# Patient Record
Sex: MALE | Race: WHITE | NOT HISPANIC OR LATINO | Employment: FULL TIME | URBAN - METROPOLITAN AREA
[De-identification: names, ages, dates, MRNs, and addresses within clinical notes are randomized per-mention and may not be internally consistent; named-entity substitution may affect disease eponyms.]

---

## 2017-04-04 ENCOUNTER — OFFICE VISIT (OUTPATIENT)
Dept: MEDICAL GROUP | Facility: PHYSICIAN GROUP | Age: 32
End: 2017-04-04
Payer: COMMERCIAL

## 2017-04-04 VITALS
OXYGEN SATURATION: 100 % | SYSTOLIC BLOOD PRESSURE: 110 MMHG | TEMPERATURE: 98.5 F | HEIGHT: 73 IN | BODY MASS INDEX: 24.39 KG/M2 | DIASTOLIC BLOOD PRESSURE: 60 MMHG | WEIGHT: 184 LBS | HEART RATE: 91 BPM | RESPIRATION RATE: 16 BRPM

## 2017-04-04 DIAGNOSIS — I10 BENIGN ESSENTIAL HTN: ICD-10-CM

## 2017-04-04 PROCEDURE — 99214 OFFICE O/P EST MOD 30 MIN: CPT | Performed by: FAMILY MEDICINE

## 2017-04-04 RX ORDER — LISINOPRIL AND HYDROCHLOROTHIAZIDE 12.5; 1 MG/1; MG/1
1 TABLET ORAL DAILY
Qty: 90 TAB | Refills: 1 | Status: SHIPPED | OUTPATIENT
Start: 2017-04-04 | End: 2019-10-08 | Stop reason: SDUPTHER

## 2017-04-04 ASSESSMENT — ENCOUNTER SYMPTOMS
EYES NEGATIVE: 1
NECK PAIN: 0
CONSTIPATION: 0
FEVER: 0
NEUROLOGICAL NEGATIVE: 1
PALPITATIONS: 0
CONSTITUTIONAL NEGATIVE: 1
COUGH: 0
DIZZINESS: 0
RESPIRATORY NEGATIVE: 1
CARDIOVASCULAR NEGATIVE: 1
HEMOPTYSIS: 0
MYALGIAS: 0
HEADACHES: 0
HYPERTENSION: 1
PSYCHIATRIC NEGATIVE: 1
MUSCULOSKELETAL NEGATIVE: 1
CHILLS: 0
GASTROINTESTINAL NEGATIVE: 1

## 2017-04-04 NOTE — PROGRESS NOTES
Subjective:      Marty Jeffries is a 32 y.o. male who presents with Follow-Up            HPI Comments: There are no diagnoses linked to this encounter.  Past Medical History:    Hypertension                                                Past Surgical History:    TONSILLECTOMY                                                  Smoking Status: Former Smoker                   Packs/Day: 0.00  Years:            Quit date: 08/25/2012    Smokeless Status: Never Used                        Alcohol Use: Yes             No family history on file.      Current outpatient prescriptions: •  lisinopril-hydrochlorothiazide (PRINZIDE, ZESTORETIC) 10-12.5 MG per tablet, Take 1 Tab by mouth every day., Disp: 90 Tab, Rfl: 1    Assessment/plan: diagnoses listed as above in problem list. Patient will continue with current medications/diet/lifestyle modifications    Patient will continue with current medication regimen, advised on taking meds every day, f/u in 3 mo.          Hypertension  This is a chronic problem. The current episode started more than 1 year ago. The problem is unchanged. The problem is controlled. Pertinent negatives include no anxiety, chest pain, headaches, neck pain or palpitations. Past treatments include ACE inhibitors and diuretics. The current treatment provides significant improvement.       Review of Systems   Constitutional: Negative.  Negative for fever and chills.        Past Medical History:    Hypertension                                                Past Surgical History:    TONSILLECTOMY                                                  Smoking Status: Former Smoker                   Packs/Day: 0.00  Years:           Quit date: 08/25/2012    Smokeless Status: Never Used                        Alcohol Use: Yes             No family history on file.     HENT: Negative.    Eyes: Negative.    Respiratory: Negative.  Negative for cough and hemoptysis.    Cardiovascular: Negative.  Negative for chest pain and  "palpitations.   Gastrointestinal: Negative.  Negative for constipation.   Genitourinary: Negative.  Negative for dysuria and urgency.   Musculoskeletal: Negative.  Negative for myalgias and neck pain.   Skin: Negative.  Negative for rash.   Neurological: Negative.  Negative for dizziness and headaches.   Endo/Heme/Allergies: Negative.    Psychiatric/Behavioral: Negative.  Negative for suicidal ideas.          Objective:     /60 mmHg  Pulse 91  Temp(Src) 36.9 °C (98.5 °F)  Resp 16  Ht 1.854 m (6' 0.99\")  Wt 83.462 kg (184 lb)  BMI 24.28 kg/m2  SpO2 100%     Physical Exam   Constitutional: He is oriented to person, place, and time. No distress.   HENT:   Head: Normocephalic and atraumatic.   Right Ear: External ear normal.   Left Ear: External ear normal.   Nose: Nose normal.   Mouth/Throat: Oropharynx is clear and moist. No oropharyngeal exudate.   Eyes: Pupils are equal, round, and reactive to light. Right eye exhibits no discharge. Left eye exhibits no discharge. No scleral icterus.   Neck: Normal range of motion. Neck supple. No JVD present. No tracheal deviation present. No thyromegaly present.   Cardiovascular: Normal rate, regular rhythm, normal heart sounds and intact distal pulses.  Exam reveals no gallop and no friction rub.    No murmur heard.  Pulmonary/Chest: Effort normal and breath sounds normal. No stridor. No respiratory distress. He has no wheezes. He has no rales. He exhibits no tenderness.   Abdominal: Soft. He exhibits no distension. There is no tenderness.   Lymphadenopathy:     He has no cervical adenopathy.   Neurological: He is alert and oriented to person, place, and time.   Skin: Skin is warm and dry. He is not diaphoretic.   Psychiatric: Judgment normal.   Nursing note and vitals reviewed.              Assessment/Plan:     There are no diagnoses linked to this encounter.    htn under control will continue meds  "

## 2017-04-04 NOTE — MR AVS SNAPSHOT
"        Marty Jeffries   2017 11:00 AM   Office Visit   MRN: [id number]    Department:  [facility]    Dept Phone:  [phone]    Description:  Male : 1985   Provider:  Juan Carlos Mancuso M.D.           Reason for Visit     Follow-Up Blood pressure      Allergies as of 2017     No Known Allergies      You were diagnosed with     Benign essential HTN   [233682]         Vital Signs     Blood Pressure Pulse Temperature Respirations Height Weight    110/60 mmHg 91 36.9 °C (98.5 °F) 16 1.854 m (6' 0.99\") 83.462 kg (184 lb)    Body Mass Index Oxygen Saturation Smoking Status             24.28 kg/m2 100% Former Smoker         Basic Information     Date Of Birth Sex Race Ethnicity Preferred Language    1985 Male White Non- English      Problem List              ICD-10-CM Priority Class Noted - Resolved    Essential hypertension I10   2015 - Present      Health Maintenance        Date Due Completion Dates    IMM DTaP/Tdap/Td Vaccine (1 - Tdap) 3/14/2002 ---            Current Immunizations     No immunizations on file.      Below and/or attached are the medications your provider expects you to take. Review all of your home medications and newly ordered medications with your provider and/or pharmacist. Follow medication instructions as directed by your provider and/or pharmacist. Please keep your medication list with you and share with your provider. Update the information when medications are discontinued, doses are changed, or new medications (including over-the-counter products) are added; and carry medication information at all times in the event of emergency situations     Allergies:  No Known Allergies          Medications  Valid as of: 2017 - 11:24 AM    Generic Name Brand Name Tablet Size Instructions for use    Lisinopril-Hydrochlorothiazide (Tab) PRINZIDE, ZESTORETIC 10-12.5 MG Take 1 Tab by mouth every day.        .                 Medicines prescribed today were sent " to:     [id number] - [place], [place] - [address]    [address] [place] [address]    Phone: [phone] Fax: [phone]    Open 24 Hours?: No      Medication refill instructions:       If your prescription bottle indicates you have medication refills left, it is not necessary to call your provider’s office. Please contact your pharmacy and they will refill your medication.    If your prescription bottle indicates you do not have any refills left, you may request refills at any time through one of the following ways: The online [employer] system (except Urgent Care), by calling your provider’s office, or by asking your pharmacy to contact your provider’s office with a refill request. Medication refills are processed only during regular business hours and may not be available until the next business day. Your provider may request additional information or to have a follow-up visit with you prior to refilling your medication.   *Please Note: Medication refills are assigned a new Rx number when refilled electronically. Your pharmacy may indicate that no refills were authorized even though a new prescription for the same medication is available at the pharmacy. Please request the medicine by name with the pharmacy before contacting your provider for a refill.           [employer] Access Code: Activation code not generated  Current [employer] Status: Active

## 2019-09-19 ENCOUNTER — OFFICE VISIT (OUTPATIENT)
Dept: URGENT CARE | Facility: PHYSICIAN GROUP | Age: 34
End: 2019-09-19
Payer: COMMERCIAL

## 2019-09-19 VITALS
SYSTOLIC BLOOD PRESSURE: 158 MMHG | TEMPERATURE: 98.9 F | HEART RATE: 111 BPM | OXYGEN SATURATION: 96 % | RESPIRATION RATE: 14 BRPM | HEIGHT: 73 IN | WEIGHT: 190 LBS | BODY MASS INDEX: 25.18 KG/M2 | DIASTOLIC BLOOD PRESSURE: 78 MMHG

## 2019-09-19 DIAGNOSIS — S00.81XA FOREHEAD ABRASION, INITIAL ENCOUNTER: ICD-10-CM

## 2019-09-19 PROCEDURE — 90471 IMMUNIZATION ADMIN: CPT | Performed by: PHYSICIAN ASSISTANT

## 2019-09-19 PROCEDURE — 99214 OFFICE O/P EST MOD 30 MIN: CPT | Mod: 25 | Performed by: PHYSICIAN ASSISTANT

## 2019-09-19 PROCEDURE — 90715 TDAP VACCINE 7 YRS/> IM: CPT | Performed by: PHYSICIAN ASSISTANT

## 2019-09-19 ASSESSMENT — ENCOUNTER SYMPTOMS
MYALGIAS: 0
HEADACHES: 0
COUGH: 0
DIZZINESS: 0
FATIGUE: 0
FEVER: 0

## 2019-09-19 NOTE — PROGRESS NOTES
Subjective:      Marty Jeffries is a 34 y.o. male who presents with Abrasion (x 2 days, forehead, tetanus )    PMH:  has a past medical history of Hypertension.  MEDS:   Current Outpatient Medications:   •  lisinopril-hydrochlorothiazide (PRINZIDE, ZESTORETIC) 10-12.5 MG per tablet, Take 1 Tab by mouth every day. (Patient not taking: Reported on 9/19/2019), Disp: 90 Tab, Rfl: 1  ALLERGIES: No Known Allergies  SURGHX:   Past Surgical History:   Procedure Laterality Date   • TONSILLECTOMY       SOCHX:  reports that he quit smoking about 7 years ago. He has never used smokeless tobacco. He reports that he drinks alcohol. He reports that he does not use drugs.  FH: Reviewed with patient, not pertinent to this visit.           Patient presents clinic today for evaluation of abrasion to forehead as well as a tetanus shot, patient cannot recall the last time he has had a tetanus vaccine.  He is sure it is been longer than 10 years.  He called his primary care provider who encouraged him to come to urgent care to have a vaccine.      Patient states he was working on his motorcycle at home and bent down striking his forehead on the foot peg.  Patient states the abrasion is superficial, he did wash it with soap and water at home and has been using some antibacterial ointment but was concerned about his tetanus status.  Patient denies any complaints of headache, fever, chills, muscle soreness, nausea, vomiting or diarrhea.  Patient denies any other complaint today.    Abrasion   This is a new problem. The current episode started yesterday. The problem occurs constantly. The problem has been gradually improving. Pertinent negatives include no chest pain, congestion, coughing, fatigue, fever, headaches, myalgias or rash. Nothing aggravates the symptoms. Treatments tried: soap/water and antibacterial ointment. The treatment provided moderate relief.       Review of Systems   Constitutional: Negative for fatigue and fever.   HENT:  "Negative.  Negative for congestion.    Respiratory: Negative for cough.    Cardiovascular: Negative for chest pain.   Musculoskeletal: Negative for myalgias.   Skin: Negative for rash.   Neurological: Negative for dizziness and headaches.   All other systems reviewed and are negative.         Objective:     /78   Pulse (!) 111   Temp 37.2 °C (98.9 °F) (Temporal)   Resp 14   Ht 1.854 m (6' 1\")   Wt 86.2 kg (190 lb)   SpO2 96%   BMI 25.07 kg/m²      Physical Exam   Constitutional: He is oriented to person, place, and time. He appears well-developed and well-nourished. No distress.   HENT:   Head: Normocephalic and atraumatic.       Nose: Nose normal.   Eyes: Pupils are equal, round, and reactive to light. Conjunctivae and EOM are normal.   Neck: Normal range of motion.   Cardiovascular: Normal rate.   Pulmonary/Chest: Effort normal.   Musculoskeletal: Normal range of motion.   Neurological: He is alert and oriented to person, place, and time. Coordination and gait normal.   Skin: Skin is warm and dry.   Psychiatric: He has a normal mood and affect.   Nursing note and vitals reviewed.              Assessment/Plan:     1. Forehead abrasion, initial encounter  Tdap =>4yo IM     PT should follow up with PCP in 1-2 days for re-evaluation if symptoms have not improved.  Discussed red flags and reasons to return to UC or ED.  Pt and/or family verbalized understanding of diagnosis and follow up instructions and was offered informational handout on diagnosis.  PT discharged.       "

## 2019-10-08 ENCOUNTER — OFFICE VISIT (OUTPATIENT)
Dept: MEDICAL GROUP | Facility: PHYSICIAN GROUP | Age: 34
End: 2019-10-08
Payer: COMMERCIAL

## 2019-10-08 VITALS
OXYGEN SATURATION: 97 % | WEIGHT: 188.9 LBS | TEMPERATURE: 99 F | SYSTOLIC BLOOD PRESSURE: 144 MMHG | BODY MASS INDEX: 25.03 KG/M2 | DIASTOLIC BLOOD PRESSURE: 90 MMHG | HEIGHT: 73 IN | HEART RATE: 106 BPM | RESPIRATION RATE: 16 BRPM

## 2019-10-08 DIAGNOSIS — I10 BENIGN ESSENTIAL HTN: ICD-10-CM

## 2019-10-08 PROCEDURE — 99214 OFFICE O/P EST MOD 30 MIN: CPT | Performed by: FAMILY MEDICINE

## 2019-10-08 RX ORDER — LISINOPRIL AND HYDROCHLOROTHIAZIDE 12.5; 1 MG/1; MG/1
1 TABLET ORAL DAILY
Qty: 90 TAB | Refills: 1 | Status: SHIPPED | OUTPATIENT
Start: 2019-10-08 | End: 2023-04-19 | Stop reason: SDUPTHER

## 2019-10-08 SDOH — HEALTH STABILITY: MENTAL HEALTH: HOW OFTEN DO YOU HAVE A DRINK CONTAINING ALCOHOL?: 4 OR MORE TIMES A WEEK

## 2019-10-08 SDOH — HEALTH STABILITY: MENTAL HEALTH: HOW MANY STANDARD DRINKS CONTAINING ALCOHOL DO YOU HAVE ON A TYPICAL DAY?: 3 OR 4

## 2019-10-08 ASSESSMENT — ENCOUNTER SYMPTOMS
PSYCHIATRIC NEGATIVE: 1
COUGH: 0
CHILLS: 0
RESPIRATORY NEGATIVE: 1
CARDIOVASCULAR NEGATIVE: 1
CONSTITUTIONAL NEGATIVE: 1
NAUSEA: 0
HYPERTENSION: 1
NEUROLOGICAL NEGATIVE: 1
MUSCULOSKELETAL NEGATIVE: 1
HEMOPTYSIS: 0
DEPRESSION: 0
DOUBLE VISION: 0
GASTROINTESTINAL NEGATIVE: 1
HEADACHES: 0
FEVER: 0
PALPITATIONS: 0
EYES NEGATIVE: 1
BRUISES/BLEEDS EASILY: 0
MYALGIAS: 0
HEARTBURN: 0
DIZZINESS: 0
TINGLING: 0
BLURRED VISION: 0

## 2019-10-08 NOTE — PROGRESS NOTES
Subjective:      Marty Jeffries is a 34 y.o. male who presents with Hypertension (saw the dentist an his blood pressure was up)            Was previously on meds for htn but bp improved and stopped over 2 years ago  bp found to be high at dentist office 170/100 and high again today   Will restart meds and f/u in 3 mo    1. Benign essential HTN    - lisinopril-hydrochlorothiazide (PRINZIDE, ZESTORETIC) 10-12.5 MG per tablet; Take 1 Tab by mouth every day.  Dispense: 90 Tab; Refill: 1    Past Medical History:  No date: Hypertension  Past Surgical History:  No date: TONSILLECTOMY  Social History    Tobacco Use      Smoking status: Former Smoker        Quit date: 2012        Years since quittin.1      Smokeless tobacco: Never Used    Alcohol use: Yes      Frequency: 4 or more times a week      Drinks per session: 3 or 4    Drug use: No    History reviewed.  No pertinent family history.      Current Outpatient Medications: •  lisinopril-hydrochlorothiazide (PRINZIDE, ZESTORETIC) 10-12.5 MG per tablet, Take 1 Tab by mouth every day., Disp: 90 Tab, Rfl: 1    Patient was instructed on the use of medications, either prescriptions or OTC and informed on when the appropriate follow up time period should be. In addition, patient was also instructed that should any acute worsening occur that they should notify this clinic asap or call 911.        Hypertension   This is a chronic problem. The current episode started more than 1 year ago. The problem has been gradually worsening since onset. The problem is uncontrolled. Pertinent negatives include no anxiety, blurred vision, chest pain, headaches or palpitations. There are no associated agents to hypertension. There are no known risk factors for coronary artery disease. Past treatments include nothing. The current treatment provides no improvement.       Review of Systems   Constitutional: Negative.  Negative for chills and fever.   HENT: Negative.  Negative for hearing  "loss.    Eyes: Negative.  Negative for blurred vision and double vision.   Respiratory: Negative.  Negative for cough and hemoptysis.    Cardiovascular: Negative.  Negative for chest pain and palpitations.   Gastrointestinal: Negative.  Negative for heartburn and nausea.   Genitourinary: Negative.  Negative for dysuria.   Musculoskeletal: Negative.  Negative for myalgias.   Skin: Negative.  Negative for rash.   Neurological: Negative.  Negative for dizziness, tingling and headaches.   Endo/Heme/Allergies: Negative.  Does not bruise/bleed easily.   Psychiatric/Behavioral: Negative.  Negative for depression and suicidal ideas.   All other systems reviewed and are negative.         Objective:     /90   Pulse (!) 106   Temp 37.2 °C (99 °F) (Temporal)   Resp 16   Ht 1.854 m (6' 1\")   Wt 85.7 kg (188 lb 14.4 oz)   SpO2 97%   BMI 24.92 kg/m²      Physical Exam   Constitutional: He is oriented to person, place, and time. He appears well-developed and well-nourished. No distress.   HENT:   Head: Normocephalic and atraumatic.   Mouth/Throat: Oropharynx is clear and moist. No oropharyngeal exudate.   Eyes: Pupils are equal, round, and reactive to light.   Cardiovascular: Normal rate, regular rhythm, normal heart sounds and intact distal pulses. Exam reveals no gallop and no friction rub.   No murmur heard.  Pulmonary/Chest: Effort normal and breath sounds normal. No respiratory distress. He has no wheezes. He has no rales. He exhibits no tenderness.   Neurological: He is alert and oriented to person, place, and time.   Skin: He is not diaphoretic.   Psychiatric: He has a normal mood and affect. His behavior is normal. Judgment and thought content normal.   Nursing note and vitals reviewed.              Assessment/Plan:     1. Benign essential HTN    - lisinopril-hydrochlorothiazide (PRINZIDE, ZESTORETIC) 10-12.5 MG per tablet; Take 1 Tab by mouth every day.  Dispense: 90 Tab; Refill: 1    "

## 2019-11-13 ENCOUNTER — OFFICE VISIT (OUTPATIENT)
Dept: MEDICAL GROUP | Facility: PHYSICIAN GROUP | Age: 34
End: 2019-11-13
Payer: COMMERCIAL

## 2019-11-13 ENCOUNTER — HOSPITAL ENCOUNTER (OUTPATIENT)
Dept: LAB | Facility: MEDICAL CENTER | Age: 34
End: 2019-11-13
Attending: FAMILY MEDICINE
Payer: COMMERCIAL

## 2019-11-13 VITALS
SYSTOLIC BLOOD PRESSURE: 146 MMHG | TEMPERATURE: 98.3 F | DIASTOLIC BLOOD PRESSURE: 80 MMHG | WEIGHT: 187.9 LBS | RESPIRATION RATE: 14 BRPM | OXYGEN SATURATION: 97 % | BODY MASS INDEX: 24.9 KG/M2 | HEART RATE: 80 BPM | HEIGHT: 73 IN

## 2019-11-13 DIAGNOSIS — I10 BENIGN ESSENTIAL HTN: ICD-10-CM

## 2019-11-13 DIAGNOSIS — M62.838 MUSCLE SPASM: ICD-10-CM

## 2019-11-13 LAB
25(OH)D3 SERPL-MCNC: 32 NG/ML (ref 30–100)
ALBUMIN SERPL BCP-MCNC: 4.8 G/DL (ref 3.2–4.9)
ALBUMIN/GLOB SERPL: 2.1 G/DL
ALP SERPL-CCNC: 52 U/L (ref 30–99)
ALT SERPL-CCNC: 20 U/L (ref 2–50)
ANION GAP SERPL CALC-SCNC: 10 MMOL/L (ref 0–11.9)
AST SERPL-CCNC: 26 U/L (ref 12–45)
BILIRUB SERPL-MCNC: 0.8 MG/DL (ref 0.1–1.5)
BUN SERPL-MCNC: 19 MG/DL (ref 8–22)
CALCIUM SERPL-MCNC: 9.7 MG/DL (ref 8.5–10.5)
CHLORIDE SERPL-SCNC: 101 MMOL/L (ref 96–112)
CO2 SERPL-SCNC: 27 MMOL/L (ref 20–33)
CREAT SERPL-MCNC: 0.9 MG/DL (ref 0.5–1.4)
ERYTHROCYTE [DISTWIDTH] IN BLOOD BY AUTOMATED COUNT: 39.6 FL (ref 35.9–50)
GLOBULIN SER CALC-MCNC: 2.3 G/DL (ref 1.9–3.5)
GLUCOSE SERPL-MCNC: 87 MG/DL (ref 65–99)
HCT VFR BLD AUTO: 49.9 % (ref 42–52)
HGB BLD-MCNC: 17.4 G/DL (ref 14–18)
MAGNESIUM SERPL-MCNC: 2.2 MG/DL (ref 1.5–2.5)
MCH RBC QN AUTO: 32.4 PG (ref 27–33)
MCHC RBC AUTO-ENTMCNC: 34.9 G/DL (ref 33.7–35.3)
MCV RBC AUTO: 92.9 FL (ref 81.4–97.8)
PLATELET # BLD AUTO: 237 K/UL (ref 164–446)
PMV BLD AUTO: 9.8 FL (ref 9–12.9)
POTASSIUM SERPL-SCNC: 3.9 MMOL/L (ref 3.6–5.5)
PROT SERPL-MCNC: 7.1 G/DL (ref 6–8.2)
RBC # BLD AUTO: 5.37 M/UL (ref 4.7–6.1)
SODIUM SERPL-SCNC: 138 MMOL/L (ref 135–145)
T3 SERPL-MCNC: 107 NG/DL (ref 60–181)
T4 FREE SERPL-MCNC: 1.1 NG/DL (ref 0.53–1.43)
WBC # BLD AUTO: 4.5 K/UL (ref 4.8–10.8)

## 2019-11-13 PROCEDURE — 83735 ASSAY OF MAGNESIUM: CPT

## 2019-11-13 PROCEDURE — 84480 ASSAY TRIIODOTHYRONINE (T3): CPT

## 2019-11-13 PROCEDURE — 80053 COMPREHEN METABOLIC PANEL: CPT

## 2019-11-13 PROCEDURE — 82306 VITAMIN D 25 HYDROXY: CPT

## 2019-11-13 PROCEDURE — 85027 COMPLETE CBC AUTOMATED: CPT

## 2019-11-13 PROCEDURE — 99214 OFFICE O/P EST MOD 30 MIN: CPT | Performed by: FAMILY MEDICINE

## 2019-11-13 PROCEDURE — 36415 COLL VENOUS BLD VENIPUNCTURE: CPT

## 2019-11-13 PROCEDURE — 84439 ASSAY OF FREE THYROXINE: CPT

## 2019-11-13 ASSESSMENT — ENCOUNTER SYMPTOMS
PALPITATIONS: 0
BRUISES/BLEEDS EASILY: 0
RESPIRATORY NEGATIVE: 1
DIZZINESS: 0
BLURRED VISION: 0
EYES NEGATIVE: 1
CHILLS: 0
HEARTBURN: 0
CARDIOVASCULAR NEGATIVE: 1
NAUSEA: 0
DOUBLE VISION: 0
CONSTITUTIONAL NEGATIVE: 1
MYALGIAS: 1
DEPRESSION: 0
GASTROINTESTINAL NEGATIVE: 1
PSYCHIATRIC NEGATIVE: 1
TINGLING: 0
MUSCLE SPASMS: 1
COUGH: 0
NEUROLOGICAL NEGATIVE: 1
FEVER: 0
HEADACHES: 0
HEMOPTYSIS: 0

## 2019-11-13 ASSESSMENT — PATIENT HEALTH QUESTIONNAIRE - PHQ9: CLINICAL INTERPRETATION OF PHQ2 SCORE: 0

## 2019-11-13 NOTE — PROGRESS NOTES
Subjective:      Marty Jeffries is a 34 y.o. male who presents with Spasms (x 1 month)            Intermittent painful spasms in different parts of the body - calves/arms/face  Lasts a few seconds and then resolves  No new meds or supplements  On zestoretic for bp    1. Muscle spasm    - Comp Metabolic Panel; Future  - FREE THYROXINE; Future  - TRIIDOTHYRONINE; Future  - VITAMIN D,25 HYDROXY; Future  - CBC WITHOUT DIFFERENTIAL; Future  - MAGNESIUM; Future    2. Benign essential HTN  Currently treated for HTN, taking meds with no CP or sob, monitors bp at home periodically. controlled    - Comp Metabolic Panel; Future  - FREE THYROXINE; Future  - TRIIDOTHYRONINE; Future  - VITAMIN D,25 HYDROXY; Future  - CBC WITHOUT DIFFERENTIAL; Future  - MAGNESIUM; Future    Past Medical History:  No date: Hypertension  Past Surgical History:  No date: TONSILLECTOMY  Social History    Tobacco Use      Smoking status: Former Smoker        Quit date: 2012        Years since quittin.2      Smokeless tobacco: Never Used    Alcohol use: Yes      Frequency: 4 or more times a week      Drinks per session: 3 or 4    Drug use: No    History reviewed.  No pertinent family history.      Current Outpatient Medications: •  lisinopril-hydrochlorothiazide (PRINZIDE, ZESTORETIC) 10-12.5 MG per tablet, Take 1 Tab by mouth every day., Disp: 90 Tab, Rfl: 1    Patient was instructed on the use of medications, either prescriptions or OTC and informed on when the appropriate follow up time period should be. In addition, patient was also instructed that should any acute worsening occur that they should notify this clinic asap or call 911.        Spasms   Associated symptoms include myalgias. Pertinent negatives include no chest pain, chills, coughing, fever, headaches, nausea or rash.       Review of Systems   Constitutional: Negative.  Negative for chills and fever.   HENT: Negative.  Negative for hearing loss.    Eyes: Negative.  Negative for  "blurred vision and double vision.   Respiratory: Negative.  Negative for cough and hemoptysis.    Cardiovascular: Negative.  Negative for chest pain and palpitations.   Gastrointestinal: Negative.  Negative for heartburn and nausea.   Genitourinary: Negative.  Negative for dysuria.   Musculoskeletal: Positive for muscle spasms and myalgias.   Skin: Negative.  Negative for rash.   Neurological: Negative.  Negative for dizziness, tingling and headaches.   Endo/Heme/Allergies: Negative.  Does not bruise/bleed easily.   Psychiatric/Behavioral: Negative.  Negative for depression and suicidal ideas.   All other systems reviewed and are negative.         Objective:     /80 (BP Location: Left arm, Patient Position: Sitting, BP Cuff Size: Adult)   Pulse 80   Temp 36.8 °C (98.3 °F) (Temporal)   Resp 14   Ht 1.854 m (6' 1\")   Wt 85.2 kg (187 lb 14.4 oz)   SpO2 97%   BMI 24.79 kg/m²      Physical Exam  Vitals signs and nursing note reviewed.   Constitutional:       General: He is not in acute distress.     Appearance: He is well-developed. He is not diaphoretic.   HENT:      Head: Normocephalic and atraumatic.      Mouth/Throat:      Pharynx: No oropharyngeal exudate.   Eyes:      Pupils: Pupils are equal, round, and reactive to light.   Cardiovascular:      Rate and Rhythm: Normal rate and regular rhythm.      Heart sounds: Normal heart sounds. No murmur. No friction rub. No gallop.    Pulmonary:      Effort: Pulmonary effort is normal. No respiratory distress.      Breath sounds: Normal breath sounds. No wheezing or rales.   Chest:      Chest wall: No tenderness.   Neurological:      Mental Status: He is alert and oriented to person, place, and time.   Psychiatric:         Behavior: Behavior normal.         Thought Content: Thought content normal.         Judgment: Judgment normal.                 Assessment/Plan:       1. Muscle spasm    - Comp Metabolic Panel; Future  - FREE THYROXINE; Future  - " TRIIDOTHYRONINE; Future  - VITAMIN D,25 HYDROXY; Future  - CBC WITHOUT DIFFERENTIAL; Future  - MAGNESIUM; Future    2. Benign essential HTN    - Comp Metabolic Panel; Future  - FREE THYROXINE; Future  - TRIIDOTHYRONINE; Future  - VITAMIN D,25 HYDROXY; Future  - CBC WITHOUT DIFFERENTIAL; Future  - MAGNESIUM; Future

## 2019-11-19 ENCOUNTER — TELEPHONE (OUTPATIENT)
Dept: MEDICAL GROUP | Facility: PHYSICIAN GROUP | Age: 34
End: 2019-11-19

## 2019-11-19 NOTE — TELEPHONE ENCOUNTER
Returned pt's call in regards to his lab results. He would like to know if starting on the vitamin D would help with his muscle spasms? He also wanted to know if there is anything that he can take to help with them or if any of the labs gave indication as to why he is having them

## 2019-11-19 NOTE — TELEPHONE ENCOUNTER
----- Message from Juan Carlos Mancuso M.D. sent at 11/14/2019 12:48 PM PST -----  Labs show patient is low on vitamin d, they needs to replace this with 2000 units per day otc

## 2019-11-20 NOTE — TELEPHONE ENCOUNTER
Pt notified. He has picked some up and will start taking it. Pt was notified to call back if he has any further problems or concerns.

## 2019-12-04 ENCOUNTER — OFFICE VISIT (OUTPATIENT)
Dept: MEDICAL GROUP | Facility: PHYSICIAN GROUP | Age: 34
End: 2019-12-04
Payer: COMMERCIAL

## 2019-12-04 VITALS
OXYGEN SATURATION: 97 % | WEIGHT: 187 LBS | DIASTOLIC BLOOD PRESSURE: 82 MMHG | BODY MASS INDEX: 27.7 KG/M2 | RESPIRATION RATE: 12 BRPM | SYSTOLIC BLOOD PRESSURE: 150 MMHG | TEMPERATURE: 97.9 F | HEART RATE: 96 BPM | HEIGHT: 69 IN

## 2019-12-04 DIAGNOSIS — I10 BENIGN ESSENTIAL HTN: ICD-10-CM

## 2019-12-04 DIAGNOSIS — F43.29 STRESS AND ADJUSTMENT REACTION: ICD-10-CM

## 2019-12-04 DIAGNOSIS — M62.838 MUSCLE SPASM: ICD-10-CM

## 2019-12-04 PROCEDURE — 99214 OFFICE O/P EST MOD 30 MIN: CPT | Performed by: FAMILY MEDICINE

## 2019-12-04 RX ORDER — BUPROPION HYDROCHLORIDE 150 MG/1
150 TABLET ORAL EVERY MORNING
Qty: 30 TAB | Refills: 3 | Status: SHIPPED | OUTPATIENT
Start: 2019-12-04 | End: 2020-02-29 | Stop reason: SDUPTHER

## 2019-12-04 ASSESSMENT — ENCOUNTER SYMPTOMS
DIZZINESS: 0
HEMOPTYSIS: 0
HEARTBURN: 0
BRUISES/BLEEDS EASILY: 0
COUGH: 0
NEUROLOGICAL NEGATIVE: 1
CHILLS: 0
EYES NEGATIVE: 1
TINGLING: 0
CONSTITUTIONAL NEGATIVE: 1
GASTROINTESTINAL NEGATIVE: 1
BLURRED VISION: 0
FEVER: 0
PALPITATIONS: 0
DOUBLE VISION: 0
HEADACHES: 0
DEPRESSION: 0
PSYCHIATRIC NEGATIVE: 1
NAUSEA: 0
MYALGIAS: 0
RESPIRATORY NEGATIVE: 1
CARDIOVASCULAR NEGATIVE: 1

## 2019-12-04 NOTE — PROGRESS NOTES
Subjective:      Marty Jeffries is a 34 y.o. male who presents with Lab Results (labs in chart)            1. Benign essential HTN  Currently treated for HTN, taking meds with no CP or sob, monitors bp at home periodically. controlled      2. Muscle spasm  Still with some occasional twitches of muscles in legs and arms and eyelids. Labs ok except for low normal vit d  Pulses normal  Does admit to being stressed out lately and may be related to that  Will give him a trial of wellbutrin and see how he does    3. Stress and adjustment reaction    - buPROPion (WELLBUTRIN XL) 150 MG XL tablet; Take 1 Tab by mouth every morning.  Dispense: 30 Tab; Refill: 3    Past Medical History:  No date: Hypertension  Past Surgical History:  No date: TONSILLECTOMY  Social History    Tobacco Use      Smoking status: Former Smoker        Quit date: 2012        Years since quittin.2      Smokeless tobacco: Never Used    Alcohol use: Yes      Frequency: 4 or more times a week      Drinks per session: 3 or 4    Drug use: No    No family history on file.      Current Outpatient Medications: •  buPROPion (WELLBUTRIN XL) 150 MG XL tablet, Take 1 Tab by mouth every morning., Disp: 30 Tab, Rfl: 3•  lisinopril-hydrochlorothiazide (PRINZIDE, ZESTORETIC) 10-12.5 MG per tablet, Take 1 Tab by mouth every day., Disp: 90 Tab, Rfl: 1    Patient was instructed on the use of medications, either prescriptions or OTC and informed on when the appropriate follow up time period should be. In addition, patient was also instructed that should any acute worsening occur that they should notify this clinic asap or call 911.          Review of Systems   Constitutional: Negative.  Negative for chills and fever.   HENT: Negative.  Negative for hearing loss.    Eyes: Negative.  Negative for blurred vision and double vision.   Respiratory: Negative.  Negative for cough and hemoptysis.    Cardiovascular: Negative.  Negative for chest pain and palpitations.  "  Gastrointestinal: Negative.  Negative for heartburn and nausea.   Genitourinary: Negative.  Negative for dysuria.   Musculoskeletal: Negative for myalgias.        Muscle twitching   Skin: Negative.  Negative for rash.   Neurological: Negative.  Negative for dizziness, tingling and headaches.   Endo/Heme/Allergies: Negative.  Does not bruise/bleed easily.   Psychiatric/Behavioral: Negative.  Negative for depression and suicidal ideas.   All other systems reviewed and are negative.         Objective:     /82   Pulse 96   Temp 36.6 °C (97.9 °F) (Temporal)   Resp 12   Ht 1.753 m (5' 9\")   Wt 84.8 kg (187 lb)   SpO2 97%   BMI 27.62 kg/m²      Physical Exam  Vitals signs and nursing note reviewed.   Constitutional:       General: He is not in acute distress.     Appearance: He is well-developed. He is not diaphoretic.   HENT:      Head: Normocephalic and atraumatic.      Mouth/Throat:      Pharynx: No oropharyngeal exudate.   Eyes:      Pupils: Pupils are equal, round, and reactive to light.   Cardiovascular:      Rate and Rhythm: Normal rate and regular rhythm.      Heart sounds: Normal heart sounds. No murmur. No friction rub. No gallop.    Pulmonary:      Effort: Pulmonary effort is normal. No respiratory distress.      Breath sounds: Normal breath sounds. No wheezing or rales.   Chest:      Chest wall: No tenderness.   Neurological:      Mental Status: He is alert and oriented to person, place, and time.   Psychiatric:         Behavior: Behavior normal.         Thought Content: Thought content normal.         Judgment: Judgment normal.                 Assessment/Plan:       1. Benign essential HTN      2. Muscle spasm      3. Stress and adjustment reaction  - buPROPion (WELLBUTRIN XL) 150 MG XL tablet; Take 1 Tab by mouth every morning.  Dispense: 30 Tab; Refill: 3    "

## 2020-02-29 DIAGNOSIS — F43.29 STRESS AND ADJUSTMENT REACTION: ICD-10-CM

## 2020-03-02 ENCOUNTER — TELEPHONE (OUTPATIENT)
Dept: MEDICAL GROUP | Facility: PHYSICIAN GROUP | Age: 35
End: 2020-03-02

## 2020-03-02 RX ORDER — BUPROPION HYDROCHLORIDE 150 MG/1
150 TABLET ORAL EVERY MORNING
Qty: 90 TAB | Refills: 1 | Status: SHIPPED | OUTPATIENT
Start: 2020-03-02

## 2020-03-02 NOTE — TELEPHONE ENCOUNTER
PT called to check on the status of his refill request submitted on 02/29.  Explained that Dr. Mancuso was working on them and would take care it.

## 2020-06-02 ENCOUNTER — HOSPITAL ENCOUNTER (OUTPATIENT)
Facility: MEDICAL CENTER | Age: 35
End: 2020-06-02
Payer: COMMERCIAL

## 2020-06-04 LAB
SARS-COV-2 RNA SPEC QL NAA+PROBE: NOT DETECTED
SPECIMEN SOURCE: NORMAL

## 2020-07-28 ENCOUNTER — HOSPITAL ENCOUNTER (OUTPATIENT)
Facility: MEDICAL CENTER | Age: 35
End: 2020-07-28
Payer: COMMERCIAL

## 2023-04-19 ENCOUNTER — OFFICE VISIT (OUTPATIENT)
Dept: MEDICAL GROUP | Facility: PHYSICIAN GROUP | Age: 38
End: 2023-04-19
Payer: COMMERCIAL

## 2023-04-19 VITALS
OXYGEN SATURATION: 98 % | RESPIRATION RATE: 18 BRPM | BODY MASS INDEX: 26.88 KG/M2 | HEIGHT: 73 IN | DIASTOLIC BLOOD PRESSURE: 86 MMHG | TEMPERATURE: 97.8 F | WEIGHT: 202.8 LBS | HEART RATE: 91 BPM | SYSTOLIC BLOOD PRESSURE: 162 MMHG

## 2023-04-19 DIAGNOSIS — I10 ESSENTIAL HYPERTENSION: ICD-10-CM

## 2023-04-19 DIAGNOSIS — I10 BENIGN ESSENTIAL HTN: ICD-10-CM

## 2023-04-19 PROCEDURE — 99204 OFFICE O/P NEW MOD 45 MIN: CPT | Performed by: FAMILY MEDICINE

## 2023-04-19 RX ORDER — LISINOPRIL AND HYDROCHLOROTHIAZIDE 12.5; 1 MG/1; MG/1
1 TABLET ORAL DAILY
Qty: 90 TABLET | Refills: 1 | Status: SHIPPED | OUTPATIENT
Start: 2023-04-19

## 2023-04-19 ASSESSMENT — ENCOUNTER SYMPTOMS
NAUSEA: 0
FEVER: 0
BLURRED VISION: 0
DOUBLE VISION: 0
HEADACHES: 0
PSYCHIATRIC NEGATIVE: 1
EYES NEGATIVE: 1
TINGLING: 0
DIZZINESS: 0
BRUISES/BLEEDS EASILY: 0
HEMOPTYSIS: 0
CHILLS: 0
RESPIRATORY NEGATIVE: 1
CONSTITUTIONAL NEGATIVE: 1
MUSCULOSKELETAL NEGATIVE: 1
DEPRESSION: 0
MYALGIAS: 0
HEARTBURN: 0
GASTROINTESTINAL NEGATIVE: 1
CARDIOVASCULAR NEGATIVE: 1
NEUROLOGICAL NEGATIVE: 1
PALPITATIONS: 0
COUGH: 0

## 2023-04-19 ASSESSMENT — PATIENT HEALTH QUESTIONNAIRE - PHQ9: CLINICAL INTERPRETATION OF PHQ2 SCORE: 0

## 2023-04-19 NOTE — PROGRESS NOTES
Subjective     Marty Jeffries is a 38 y.o. male who presents with Establish Care (Bp concerns)            Out of bp meds for three years now , bp high, some headaches but no other sx  Will restart meds and f/u in 4 weeks for efficacy   With labs  1. Essential hypertension     HEMOGLOBIN A1C; Future   Lipid Profile; Future   Basic Metabolic Panel; Future   CBC WITHOUT DIFFERENTIAL; Future    2. Benign essential HTN     lisinopril-hydrochlorothiazide (PRINZIDE) 10-12.5 MG per tablet; Take 1 Tablet by mouth every day.  Dispense: 90 Tablet; Refill: 1   HEMOGLOBIN A1C; Future   Lipid Profile; Future   Basic Metabolic Panel; Future   CBC WITHOUT DIFFERENTIAL; Future    Past Medical History:  No date: Hypertension  Past Surgical History:  No date: TONSILLECTOMY  Social History    Tobacco Use      Smoking status: Former        Types: Cigarettes        Quit date: 8/25/2012        Years since quitting: 10.6      Smokeless tobacco: Never    Vaping Use      Vaping Use: Never used    Alcohol use: Yes    Drug use: No    No family history on file.      Current Outpatient Medications: ·  lisinopril-hydrochlorothiazide (PRINZIDE) 10-12.5 MG per tablet, Take 1 Tablet by mouth every day., Disp: 90 Tablet, Rfl: 1·  buPROPion (WELLBUTRIN XL) 150 MG XL tablet, Take 1 Tab by mouth every morning. (Patient not taking: Reported on 4/19/2023), Disp: 90 Tab, Rfl: 1    Patient was instructed on the use of medications, either prescriptions or OTC and informed on when the appropriate follow up time period should be. In addition, patient was also instructed that should any acute worsening occur that they should notify this clinic asap or call 911.            Review of Systems   Constitutional: Negative.  Negative for chills and fever.   HENT: Negative.  Negative for hearing loss.    Eyes: Negative.  Negative for blurred vision and double vision.   Respiratory: Negative.  Negative for cough and hemoptysis.    Cardiovascular: Negative.  Negative for  "chest pain and palpitations.   Gastrointestinal: Negative.  Negative for heartburn and nausea.   Genitourinary: Negative.  Negative for dysuria.   Musculoskeletal: Negative.  Negative for myalgias.   Skin: Negative.  Negative for rash.   Neurological: Negative.  Negative for dizziness, tingling and headaches.   Endo/Heme/Allergies: Negative.  Does not bruise/bleed easily.   Psychiatric/Behavioral: Negative.  Negative for depression and suicidal ideas.    All other systems reviewed and are negative.           Objective     BP (!) 162/86 (BP Location: Left arm, Patient Position: Sitting, BP Cuff Size: Adult long)   Pulse 91   Temp 36.6 °C (97.8 °F) (Temporal)   Resp 18   Ht 1.854 m (6' 1\")   Wt 92 kg (202 lb 12.8 oz)   SpO2 98%   BMI 26.76 kg/m²      Physical Exam  Vitals and nursing note reviewed.   Constitutional:       General: He is not in acute distress.     Appearance: He is well-developed. He is not diaphoretic.   HENT:      Head: Normocephalic and atraumatic.      Mouth/Throat:      Pharynx: No oropharyngeal exudate.   Eyes:      Pupils: Pupils are equal, round, and reactive to light.   Cardiovascular:      Rate and Rhythm: Normal rate and regular rhythm.      Heart sounds: Normal heart sounds. No murmur heard.    No friction rub. No gallop.   Pulmonary:      Effort: Pulmonary effort is normal. No respiratory distress.      Breath sounds: Normal breath sounds. No wheezing or rales.   Chest:      Chest wall: No tenderness.   Neurological:      Mental Status: He is alert and oriented to person, place, and time.   Psychiatric:         Behavior: Behavior normal.         Thought Content: Thought content normal.         Judgment: Judgment normal.                           Assessment & Plan        1. Essential hypertension    - HEMOGLOBIN A1C; Future  - Lipid Profile; Future  - Basic Metabolic Panel; Future  - CBC WITHOUT DIFFERENTIAL; Future    2. Benign essential HTN    - lisinopril-hydrochlorothiazide " (PRINZIDE) 10-12.5 MG per tablet; Take 1 Tablet by mouth every day.  Dispense: 90 Tablet; Refill: 1  - HEMOGLOBIN A1C; Future  - Lipid Profile; Future  - Basic Metabolic Panel; Future  - CBC WITHOUT DIFFERENTIAL; Future

## 2023-05-17 ENCOUNTER — APPOINTMENT (OUTPATIENT)
Dept: MEDICAL GROUP | Facility: PHYSICIAN GROUP | Age: 38
End: 2023-05-17
Payer: COMMERCIAL

## 2023-05-24 ENCOUNTER — APPOINTMENT (OUTPATIENT)
Dept: MEDICAL GROUP | Facility: PHYSICIAN GROUP | Age: 38
End: 2023-05-24
Payer: COMMERCIAL